# Patient Record
Sex: FEMALE | URBAN - METROPOLITAN AREA
[De-identification: names, ages, dates, MRNs, and addresses within clinical notes are randomized per-mention and may not be internally consistent; named-entity substitution may affect disease eponyms.]

---

## 2022-08-04 ENCOUNTER — APPOINTMENT (RX ONLY)
Dept: URBAN - METROPOLITAN AREA CLINIC 10 | Facility: CLINIC | Age: 47
Setting detail: DERMATOLOGY
End: 2022-08-04

## 2022-08-04 DIAGNOSIS — B35.4 TINEA CORPORIS: ICD-10-CM

## 2022-08-04 DIAGNOSIS — L30.2 CUTANEOUS AUTOSENSITIZATION: ICD-10-CM

## 2022-08-04 PROCEDURE — 99203 OFFICE O/P NEW LOW 30 MIN: CPT

## 2022-08-04 PROCEDURE — ? PRESCRIPTION

## 2022-08-04 PROCEDURE — ? COUNSELING

## 2022-08-04 RX ORDER — TRIAMCINOLONE ACETONIDE 1 MG/G
SMALL AMOUNT OINTMENT TOPICAL QD
Qty: 80 | Refills: 0 | Status: ERX | COMMUNITY
Start: 2022-08-04

## 2022-08-04 RX ORDER — KETOCONAZOLE 20 MG/ML
SMALL AMOUNT SHAMPOO, SUSPENSION TOPICAL DAILY
Qty: 120 | Refills: 0 | Status: ERX | COMMUNITY
Start: 2022-08-04

## 2022-08-04 RX ADMIN — KETOCONAZOLE SMALL AMOUNT: 20 SHAMPOO, SUSPENSION TOPICAL at 00:00

## 2022-08-04 RX ADMIN — TRIAMCINOLONE ACETONIDE SMALL AMOUNT: 1 OINTMENT TOPICAL at 00:00

## 2022-08-04 ASSESSMENT — LOCATION SIMPLE DESCRIPTION DERM
LOCATION SIMPLE: LEFT UPPER ARM
LOCATION SIMPLE: RIGHT UPPER ARM
LOCATION SIMPLE: ABDOMEN
LOCATION SIMPLE: LEFT THIGH
LOCATION SIMPLE: RIGHT UPPER ARM
LOCATION SIMPLE: LEFT UPPER ARM

## 2022-08-04 ASSESSMENT — LOCATION DETAILED DESCRIPTION DERM
LOCATION DETAILED: RIGHT ANTECUBITAL SKIN
LOCATION DETAILED: RIGHT LATERAL ABDOMEN
LOCATION DETAILED: LEFT ANTERIOR DISTAL UPPER ARM
LOCATION DETAILED: RIGHT ANTECUBITAL SKIN
LOCATION DETAILED: LEFT ANTECUBITAL SKIN
LOCATION DETAILED: LEFT ANTERIOR PROXIMAL THIGH
LOCATION DETAILED: RIGHT ANTERIOR DISTAL UPPER ARM

## 2022-08-04 ASSESSMENT — LOCATION ZONE DERM
LOCATION ZONE: TRUNK
LOCATION ZONE: ARM
LOCATION ZONE: ARM
LOCATION ZONE: LEG

## 2022-08-04 NOTE — HPI: RASH
How Severe Is Your Rash?: moderate
Is This A New Presentation, Or A Follow-Up?: Rash
Additional History: Patient has been using OTC anti fungal creams including clotramazole, but these have not alleviated her symptoms. She states that she experiences similar rashes on a yearly basis which flare up in the summer.

## 2025-01-28 ENCOUNTER — APPOINTMENT (OUTPATIENT)
Dept: URBAN - METROPOLITAN AREA CLINIC 10 | Facility: CLINIC | Age: 50
Setting detail: DERMATOLOGY
End: 2025-01-28

## 2025-01-28 DIAGNOSIS — Z71.89 OTHER SPECIFIED COUNSELING: ICD-10-CM

## 2025-01-28 DIAGNOSIS — B35.4 TINEA CORPORIS: ICD-10-CM

## 2025-01-28 DIAGNOSIS — B35.1 TINEA UNGUIUM: ICD-10-CM

## 2025-01-28 PROCEDURE — ? COUNSELING

## 2025-01-28 PROCEDURE — ? PRESCRIPTION

## 2025-01-28 PROCEDURE — 99213 OFFICE O/P EST LOW 20 MIN: CPT

## 2025-01-28 RX ORDER — TERBINAFINE HYDROCHLORIDE 1 G/100G
THIN FILM CREAM TOPICAL BID
Qty: 30 | Refills: 11 | Status: ERX | COMMUNITY
Start: 2025-01-28

## 2025-01-28 RX ADMIN — TERBINAFINE HYDROCHLORIDE THIN FILM: 1 CREAM TOPICAL at 00:00

## 2025-01-28 ASSESSMENT — LOCATION SIMPLE DESCRIPTION DERM
LOCATION SIMPLE: ABDOMEN
LOCATION SIMPLE: LEFT 5TH TOE
LOCATION SIMPLE: RIGHT 5TH TOE
LOCATION SIMPLE: RIGHT 4TH TOE
LOCATION SIMPLE: LEFT 4TH TOE

## 2025-01-28 ASSESSMENT — LOCATION DETAILED DESCRIPTION DERM
LOCATION DETAILED: RIGHT 4TH TOENAIL
LOCATION DETAILED: RIGHT 5TH TOENAIL
LOCATION DETAILED: LEFT LATERAL ABDOMEN
LOCATION DETAILED: LEFT DORSAL 5TH TOE
LOCATION DETAILED: RIGHT LATERAL ABDOMEN
LOCATION DETAILED: LEFT 4TH TOENAIL

## 2025-01-28 ASSESSMENT — LOCATION ZONE DERM
LOCATION ZONE: TRUNK
LOCATION ZONE: TOENAIL
LOCATION ZONE: TOE

## 2025-01-28 NOTE — PROCEDURE: COUNSELING
Detail Level: Detailed
Patient Specific Counseling (Will Not Stick From Patient To Patient): Declined treatment of onychomycosis after discussion of treatment options.
Patient Specific Counseling (Will Not Stick From Patient To Patient): See Terbinafine cream above - should have been prescribed under this diagnosis.